# Patient Record
Sex: MALE | Race: WHITE | Employment: FULL TIME | URBAN - METROPOLITAN AREA
[De-identification: names, ages, dates, MRNs, and addresses within clinical notes are randomized per-mention and may not be internally consistent; named-entity substitution may affect disease eponyms.]

---

## 2022-03-31 ENCOUNTER — HOSPITAL ENCOUNTER (EMERGENCY)
Age: 22
Discharge: HOME | End: 2022-03-31
Attending: EMERGENCY MEDICINE
Payer: COMMERCIAL

## 2022-03-31 VITALS
TEMPERATURE: 98.4 F | HEIGHT: 69 IN | RESPIRATION RATE: 16 BRPM | BODY MASS INDEX: 27 KG/M2 | DIASTOLIC BLOOD PRESSURE: 74 MMHG | WEIGHT: 182.32 LBS | OXYGEN SATURATION: 100 % | HEART RATE: 74 BPM | SYSTOLIC BLOOD PRESSURE: 145 MMHG

## 2022-03-31 DIAGNOSIS — S61.211A LACERATION OF LEFT INDEX FINGER WITHOUT FOREIGN BODY WITHOUT DAMAGE TO NAIL, INITIAL ENCOUNTER: Primary | ICD-10-CM

## 2022-03-31 PROCEDURE — 99285 EMERGENCY DEPT VISIT HI MDM: CPT

## 2022-03-31 PROCEDURE — 12001 RPR S/N/AX/GEN/TRNK 2.5CM/<: CPT

## 2022-03-31 ASSESSMENT — PAIN SCALES - GENERAL
PAINLEVEL_OUTOF10: 0
PAINLEVEL_OUTOF10: 2

## 2022-03-31 ASSESSMENT — PAIN DESCRIPTION - PROGRESSION: CLINICAL_PROGRESSION: NOT CHANGED

## 2022-03-31 ASSESSMENT — PAIN - FUNCTIONAL ASSESSMENT
PAIN_FUNCTIONAL_ASSESSMENT: PREVENTS OR INTERFERES SOME ACTIVE ACTIVITIES AND ADLS
PAIN_FUNCTIONAL_ASSESSMENT: 0-10

## 2022-03-31 ASSESSMENT — PAIN DESCRIPTION - LOCATION: LOCATION: FINGER (COMMENT WHICH ONE)

## 2022-03-31 ASSESSMENT — PAIN DESCRIPTION - PAIN TYPE: TYPE: ACUTE PAIN

## 2022-03-31 ASSESSMENT — PAIN DESCRIPTION - DIRECTION: RADIATING_TOWARDS: NON RADIATING

## 2022-03-31 ASSESSMENT — PAIN DESCRIPTION - FREQUENCY: FREQUENCY: CONTINUOUS

## 2022-03-31 ASSESSMENT — PAIN DESCRIPTION - ORIENTATION: ORIENTATION: LEFT

## 2022-03-31 ASSESSMENT — PAIN DESCRIPTION - ONSET: ONSET: SUDDEN

## 2022-03-31 ASSESSMENT — PAIN DESCRIPTION - DESCRIPTORS: DESCRIPTORS: SORE

## 2022-03-31 NOTE — LETTER
[facility] [address]  Phone: [phone]               March 31, 2022    Patient: Jorge Quijano   YOB: 2000   Date of Visit: 3/31/2022       To Whom It May Concern:    Jkaob Salvador was seen and treated in our emergency department on 3/31/2022.        Sincerely,       Toshia Glass RN         Signature:__________________________________

## 2022-04-01 NOTE — ED NOTES
Patient's finger cleansed, antibiotic ointment applied, non adhering dressing and static finger splint applied. Secured to hand with ace wrap.       Janie Harper RN  03/31/22 2043

## 2022-04-01 NOTE — ED PROVIDER NOTES
[facility] Emergency Department      Pt Name: Heena Castañeda  MRN: [id number]  [place] 2000  Date of evaluation: 3/31/2022  Provider: Nathaniel Edwards MD  CHIEF COMPLAINT  Chief Complaint   Patient presents with    Laceration     Patient cut his finger with a  across his knuckle around 12:00 noon today. HPI  Heena Castañeda is a 24 y.o. male who presents because of laceration to the left index finger. He was using a  at home and accidentally cut his left index finger. Injury happened about noon. He wrapped it up. When his mother saw the cut, she brought him to the hospital.  He denies any difficulty moving his finger or numbness. REVIEW OF SYSTEMS:  No weakness, no numbness, last dt in 2018, right handed Pertinent positives and negatives as per the HPI. All other review of systems reviewed and negative. Nursing notes reviewed. PAST MEDICAL HISTORY  History reviewed. No pertinent past medical history. SURGICAL HISTORY  Past Surgical History:   Procedure Laterality Date    ADENOIDECTOMY      HAND SURGERY      NASAL SINUS SURGERY      TONSILLECTOMY      and adenoids    TYMPANOSTOMY TUBE PLACEMENT       MEDICATIONS:  No current facility-administered medications on file prior to encounter. No current outpatient medications on file prior to encounter. ALLERGIES  Patient has no known allergies. SOCIAL HISTORY:  Social History     Tobacco Use    Smoking status: Current Every Day Smoker     Types: E-Cigarettes    Smokeless tobacco: Never Used   Vaping Use    Vaping Use: Every day    Substances: Nicotine   Substance Use Topics    Alcohol use: Not Currently    Drug use: Never     IMMUNIZATIONS:    There is no immunization history on file for this patient. PHYSICAL EXAM  VITAL SIGNS:  Blood pressure (!) 155/76, pulse 79, temperature 98.4 °F (36.9 °C), temperature source Oral, resp.  rate 16, height 5' 9\" (1.753 m), weight 182 lb 5.1 oz (82.7 kg), SpO2 99 %.  Constitutional:  24 y.o. male who does not appear toxic or acutely ill  HENT:  Mucous membranes moist, atraumatic  Neck:  Supple, no signs of injury  Thorax & Lungs:  Respiratory effort normal  Abdomen:  Non distended  Injured Area:  Left index finger with dorsal laceration over the IP joint, 2.5 cm lac that is superficial, no visible tendon, strength with full extension is normal, light touch sensation intact, minimal bleeding  Back:  No deformity  Extremities:  No cyanosis    DIAGNOSTIC RESULTS:    RADIOLOGY:  None     ED COURSE:  Uneventful    PROCEDURES:  Laceration Repair: The patient gave permission for me to proceed. I prepped and draped the patient's wound in a sterile fashion and anesthetized the skin with 1 ml of anesthetic w/o epinephrine. I cleansed the wound with normal saline and explored the wound for foreign material, found none. No visible findings of tendon injury. I closed the wound with 7 4-0 nylon sutures with good wound approximation. The patient tolerated the procedure well. CONSULTATIONS:  None    MEDICAL DECISION MAKING: Gail Hong is a 24 y.o. male who presented because of laceration. There is no evidence of associated complication. The wound has been examined and repaired. We will provide instructions on wound care and reasons to return or have wound checked prior to scheduled follow up. The stitches need to be removed in 10 to 14 days. Differential Diagnosis:  Retained FB, neurovascular injury, hemorrhage, infection, tendon laceration, other    FOLLOW UP:    [facility]) Pre-Services  [phone]  Schedule an appointment as soon as possible for a visit   For suture removal in 10 to 14 days    FINAL IMPRESSION:    1. Laceration of left index finger without foreign body without damage to nail, initial encounter        (Please note that I used voice recognition software to generate this note.   Occasionally words are mistranscribed despite my efforts to edit errors.)        Kendra Edmond MD  03/31/22 2050

## 2022-04-01 NOTE — ED TRIAGE NOTES
Patient ambulatory to Room 4 with c/o left finger laceration. Patient states he was using a  and accidentally cut across his left knuckle around 12:00 today. Pulse, motor and sensation intact, wound bleeds when patient bends his finger. Denies other injuries. He is awake, alert, oriented, respirations easy & regular, skin w/d, MMM & pink, cap refill brisk. Dr. Tramaine Henriquez in room to examine patient.

## 2022-04-01 NOTE — ED NOTES
Discharge instructions reviewed with patient and verbalized understanding, denies further questions and successful teach back occurred. Discharged ambulatory with steady gait to ED lobby. Written discharge instructions and note provided to patient.       Janie Harper RN  03/31/22 [id number]